# Patient Record
Sex: MALE | Race: WHITE | URBAN - METROPOLITAN AREA
[De-identification: names, ages, dates, MRNs, and addresses within clinical notes are randomized per-mention and may not be internally consistent; named-entity substitution may affect disease eponyms.]

---

## 2017-07-11 ENCOUNTER — EMERGENCY (EMERGENCY)
Facility: HOSPITAL | Age: 62
LOS: 1 days | Discharge: HOME | End: 2017-07-11
Attending: EMERGENCY MEDICINE | Admitting: EMERGENCY MEDICINE
Payer: COMMERCIAL

## 2017-07-11 VITALS
OXYGEN SATURATION: 98 % | HEART RATE: 70 BPM | RESPIRATION RATE: 20 BRPM | DIASTOLIC BLOOD PRESSURE: 93 MMHG | SYSTOLIC BLOOD PRESSURE: 190 MMHG | TEMPERATURE: 99 F

## 2017-07-11 VITALS
DIASTOLIC BLOOD PRESSURE: 91 MMHG | RESPIRATION RATE: 20 BRPM | SYSTOLIC BLOOD PRESSURE: 179 MMHG | OXYGEN SATURATION: 99 % | HEART RATE: 72 BPM

## 2017-07-11 LAB
BASOPHILS # BLD AUTO: 0.1 K/UL — SIGNIFICANT CHANGE UP (ref 0–0.2)
BASOPHILS NFR BLD AUTO: 1 % — SIGNIFICANT CHANGE UP (ref 0–2)
EOSINOPHIL # BLD AUTO: 0.1 K/UL — SIGNIFICANT CHANGE UP (ref 0–0.5)
EOSINOPHIL NFR BLD AUTO: 1.2 % — SIGNIFICANT CHANGE UP (ref 0–6)
HCT VFR BLD CALC: 42.8 % — SIGNIFICANT CHANGE UP (ref 39–50)
HGB BLD-MCNC: 15 G/DL — SIGNIFICANT CHANGE UP (ref 13–17)
LYMPHOCYTES # BLD AUTO: 4.3 K/UL — HIGH (ref 1–3.3)
LYMPHOCYTES # BLD AUTO: 47.5 % — HIGH (ref 13–44)
MCHC RBC-ENTMCNC: 33.4 PG — SIGNIFICANT CHANGE UP (ref 27–34)
MCHC RBC-ENTMCNC: 35.1 GM/DL — SIGNIFICANT CHANGE UP (ref 32–36)
MCV RBC AUTO: 95.1 FL — SIGNIFICANT CHANGE UP (ref 80–100)
MONOCYTES # BLD AUTO: 0.8 K/UL — SIGNIFICANT CHANGE UP (ref 0–0.9)
MONOCYTES NFR BLD AUTO: 8.3 % — SIGNIFICANT CHANGE UP (ref 2–14)
NEUTROPHILS # BLD AUTO: 3.8 K/UL — SIGNIFICANT CHANGE UP (ref 1.8–7.4)
NEUTROPHILS NFR BLD AUTO: 42 % — LOW (ref 43–77)
PLATELET # BLD AUTO: 218 K/UL — SIGNIFICANT CHANGE UP (ref 150–400)
RBC # BLD: 4.5 M/UL — SIGNIFICANT CHANGE UP (ref 4.2–5.8)
RBC # FLD: 12 % — SIGNIFICANT CHANGE UP (ref 10.3–14.5)
WBC # BLD: 9.1 K/UL — SIGNIFICANT CHANGE UP (ref 3.8–10.5)
WBC # FLD AUTO: 9.1 K/UL — SIGNIFICANT CHANGE UP (ref 3.8–10.5)

## 2017-07-11 PROCEDURE — 83690 ASSAY OF LIPASE: CPT

## 2017-07-11 PROCEDURE — 85027 COMPLETE CBC AUTOMATED: CPT

## 2017-07-11 PROCEDURE — 99284 EMERGENCY DEPT VISIT MOD MDM: CPT

## 2017-07-11 PROCEDURE — 81001 URINALYSIS AUTO W/SCOPE: CPT

## 2017-07-11 PROCEDURE — 80053 COMPREHEN METABOLIC PANEL: CPT

## 2017-07-11 PROCEDURE — 99283 EMERGENCY DEPT VISIT LOW MDM: CPT

## 2017-07-11 RX ORDER — METFORMIN HYDROCHLORIDE 850 MG/1
0 TABLET ORAL
Qty: 0 | Refills: 0 | COMMUNITY

## 2017-07-11 RX ORDER — ACETAMINOPHEN 500 MG
650 TABLET ORAL ONCE
Qty: 0 | Refills: 0 | Status: COMPLETED | OUTPATIENT
Start: 2017-07-11 | End: 2017-07-11

## 2017-07-11 RX ORDER — ICOSAPENT ETHYL 500 MG/1
0 CAPSULE, LIQUID FILLED ORAL
Qty: 0 | Refills: 0 | COMMUNITY

## 2017-07-11 RX ADMIN — Medication 650 MILLIGRAM(S): at 23:54

## 2017-07-11 NOTE — ED PROVIDER NOTE - OBJECTIVE STATEMENT
Pt is a 62 year old M w/ PMH of DM2, hypothyroidism, HTN, HLD, and lumbar herniations that presents to the ED with right flank pain. He states that the pain started today at 3 PM after he had. He describes it as a achy sensation that is worse with certain types of movements. He tried Advil at home with some relief. The pain radiates to the back. He denies hematuria, dysuria, saddle anesthesia, fevers, SOB, CP, travel hx, N/V, or diarrhea. He does admit to having constipation over the past two days. Pt is a 62 year old M w/ PMH of DM2, hypothyroidism, HTN, HLD, and lumbar herniations that presents to the ED with right flank pain. He states that the pain started today at 3 PM after he had. He describes it as a sharp or burning sensation that is worse with certain types of movements. He tried Advil at home with some relief. The pain radiates to the back. He denies hematuria, dysuria, saddle anesthesia, fevers, SOB, CP, travel hx, N/V, or diarrhea. He does admit to having constipation over the past two days.

## 2017-07-11 NOTE — ED ADULT NURSE NOTE - OBJECTIVE STATEMENT
63 y/o male hx of DM, enlarge heart and hypothyroidism bib daughter c/o right flank pain since 3PM. Pt denies nausea/vomiting, no pain during urination, report constipation. Denies chills/ fever, no CP or SOB. BS at all quadrants, non tender to touch, non distended. Safety maintained & continue monitor.

## 2017-07-11 NOTE — ED PROVIDER NOTE - GENITOURINARY NEGATIVE STATEMENT, MLM
no dysuria, no frequency, and no hematuria. no dysuria, no frequency, and no hematuria, no incontinence or difficulty urinating

## 2017-07-11 NOTE — ED PROVIDER NOTE - CARE PLAN
Principal Discharge DX:	Lumbar back pain  Goal:	- resolution of pain  Instructions for follow-up, activity and diet:	- Please follow up with your primary care physician.   - Please take Tylenol as needed for pain

## 2017-07-11 NOTE — ED PROVIDER NOTE - ATTENDING CONTRIBUTION TO CARE
ATTENDING MD:  Norris LEWIS, personally have seen and examined this patient.  I have discussed all aspects of care with the resident physician. Resident note reviewed and agree on plan of care and except where noted.  See HPI, PE, and MDM for details.     VITALS: reviewed  GEN: NAD, A & O x 4  HEAD/EYES: NCAT, PERRL, EOMI, anicteric sclerae, no conjunctival pallor  ENT: mucus membranes moist, oropharynx WNL, trachea midline, no JVD  CHEST: lungs CTA with equal breath sounds bilaterally, chest wall nontender and atraumatic  CV: heart with reg rhythm S1, S2, no murmur; distal pulses intact and symmetric bilaterally  ABDOMEN: normoactive bowel sounds, soft, nondistended, nontender, no masses  : no CVAT, no suprapubic tenderness or fullness  MSK: extremities atraumatic and nontender, no edema. the back is without midline tenderness, deformity or stepoff and is ranged with significant rotation reproducing pain. there is a mid-lumbar point paraspinally that is poitn tender and refers pain across the abdomen reproducing pain of complaint. the neck has no midline tenderness, deformity, or stepoff, and is ranged painlessly. negative straight leg raise bilaterally.  SKIN: no rash, no bruising, no cyanosis. color appropriate for ethnicity  NEURO: alert, mentating appropriately, no facial asymmetry. gross sensation, motor, coordination are intact  PSYCH: Affect appropriate     MDM: likely radicular pain, improve with tylenol. advised pt on nature of disease and course of illness, warning signs. pt will take tylenola nd motrin and f/u with PCP.

## 2017-07-11 NOTE — ED PROVIDER NOTE - PLAN OF CARE
- resolution of pain - Please follow up with your primary care physician.   - Please take Tylenol as needed for pain

## 2017-07-11 NOTE — ED PROVIDER NOTE - MUSCULOSKELETAL NEGATIVE STATEMENT, MLM
no back pain, no gout, no musculoskeletal pain, no neck pain, and no weakness. no neck pain, and no weakness. known disc disease

## 2017-07-11 NOTE — ED PROVIDER NOTE - MEDICAL DECISION MAKING DETAILS
Pt is a 62 year old M with lumbar disk herniation that presents with right flank pain. The patients pain was thought to be related to back pain given his history. Lab work was taken to r/o pancreatitis and biliary pathology. UA was taken to r/o blood in urine. Pt was discharged once all was negative and pain resolved with Tylenol.

## 2017-07-12 LAB
ALBUMIN SERPL ELPH-MCNC: 4.3 G/DL — SIGNIFICANT CHANGE UP (ref 3.3–5)
ALP SERPL-CCNC: 59 U/L — SIGNIFICANT CHANGE UP (ref 40–120)
ALT FLD-CCNC: 18 U/L RC — SIGNIFICANT CHANGE UP (ref 10–45)
ANION GAP SERPL CALC-SCNC: 15 MMOL/L — SIGNIFICANT CHANGE UP (ref 5–17)
APPEARANCE UR: ABNORMAL
AST SERPL-CCNC: 27 U/L — SIGNIFICANT CHANGE UP (ref 10–40)
BILIRUB SERPL-MCNC: 0.3 MG/DL — SIGNIFICANT CHANGE UP (ref 0.2–1.2)
BILIRUB UR-MCNC: NEGATIVE — SIGNIFICANT CHANGE UP
BUN SERPL-MCNC: 13 MG/DL — SIGNIFICANT CHANGE UP (ref 7–23)
CALCIUM SERPL-MCNC: 8.9 MG/DL — SIGNIFICANT CHANGE UP (ref 8.4–10.5)
CHLORIDE SERPL-SCNC: 104 MMOL/L — SIGNIFICANT CHANGE UP (ref 96–108)
CO2 SERPL-SCNC: 22 MMOL/L — SIGNIFICANT CHANGE UP (ref 22–31)
COLOR SPEC: YELLOW — SIGNIFICANT CHANGE UP
COMMENT - URINE: SIGNIFICANT CHANGE UP
CREAT SERPL-MCNC: 0.77 MG/DL — SIGNIFICANT CHANGE UP (ref 0.5–1.3)
DIFF PNL FLD: NEGATIVE — SIGNIFICANT CHANGE UP
GLUCOSE SERPL-MCNC: 97 MG/DL — SIGNIFICANT CHANGE UP (ref 70–99)
GLUCOSE UR QL: 500
KETONES UR-MCNC: NEGATIVE — SIGNIFICANT CHANGE UP
LEUKOCYTE ESTERASE UR-ACNC: NEGATIVE — SIGNIFICANT CHANGE UP
LIDOCAIN IGE QN: 34 U/L — SIGNIFICANT CHANGE UP (ref 7–60)
NITRITE UR-MCNC: NEGATIVE — SIGNIFICANT CHANGE UP
PH UR: 6 — SIGNIFICANT CHANGE UP (ref 5–8)
POTASSIUM SERPL-MCNC: 4.6 MMOL/L — SIGNIFICANT CHANGE UP (ref 3.5–5.3)
POTASSIUM SERPL-SCNC: 4.6 MMOL/L — SIGNIFICANT CHANGE UP (ref 3.5–5.3)
PROT SERPL-MCNC: 7.5 G/DL — SIGNIFICANT CHANGE UP (ref 6–8.3)
PROT UR-MCNC: 30 MG/DL
RBC CASTS # UR COMP ASSIST: SIGNIFICANT CHANGE UP /HPF (ref 0–2)
SODIUM SERPL-SCNC: 141 MMOL/L — SIGNIFICANT CHANGE UP (ref 135–145)
SP GR SPEC: >1.03 — HIGH (ref 1.01–1.02)
UROBILINOGEN FLD QL: NEGATIVE — SIGNIFICANT CHANGE UP
WBC UR QL: SIGNIFICANT CHANGE UP /HPF (ref 0–5)

## 2018-07-08 NOTE — ED PROVIDER NOTE - PSYCHIATRIC, MLM
MED Alert and oriented to person, place, time/situation. normal mood and affect. no apparent risk to self or others.

## 2021-08-10 ENCOUNTER — APPOINTMENT (OUTPATIENT)
Dept: ORTHOPEDIC SURGERY | Facility: CLINIC | Age: 66
End: 2021-08-10

## 2021-08-18 ENCOUNTER — APPOINTMENT (OUTPATIENT)
Dept: ORTHOPEDIC SURGERY | Facility: CLINIC | Age: 66
End: 2021-08-18
Payer: MEDICARE

## 2021-08-18 ENCOUNTER — NON-APPOINTMENT (OUTPATIENT)
Age: 66
End: 2021-08-18

## 2021-08-18 VITALS — BODY MASS INDEX: 28.93 KG/M2 | HEIGHT: 66 IN | WEIGHT: 180 LBS

## 2021-08-18 DIAGNOSIS — M25.561 PAIN IN RIGHT KNEE: ICD-10-CM

## 2021-08-18 PROCEDURE — 73564 X-RAY EXAM KNEE 4 OR MORE: CPT | Mod: RT

## 2021-08-18 PROCEDURE — 99204 OFFICE O/P NEW MOD 45 MIN: CPT

## 2021-08-18 NOTE — PHYSICAL EXAM
[de-identified] : Constitutional:Well nourished , well developed and in no acute distress\par Psychiatric: Alert and oriented to time place and person.Appropriate affect \par Skin:Head, neck, arms and lower extremities:no lesions or discoloration\par HEENT: Normocephalic, EOM intact, Nasal septum midline,\par Respiratory: Unlabored respirations,no audible wheezing ,no tachypnea, no cyanosis\par Cardiovascular: no leg swelling  no ankle edema no JVD, pulse regular\par Vascular: no calf or thigh tenderness, \par Peripheral pulses; intact\par Lymphatics:No groin adenopathy,no lymphedema lower  or upper extremities\par Right knee normal alignment effusion 1-2+ marked focal tenderness medial femoral condyle passive range of motion 5/90 provocation of pain ligaments grossly intact neurovascular intact patella quadriceps tendon intact [de-identified] : X-rays right knee multiple views negative for fracture

## 2021-08-18 NOTE — HISTORY OF PRESENT ILLNESS
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  On July 21, 2021 patient was stepping down from a sidewalk when he fell and struck the anterior aspect of right knee.  The end underwent emergency room evaluation.  X-rays were according to daughter negative for fracture.  Patient dancing chronic pain anteromedial right knee aggravated by walking and associated with decreased range of motion.  He is ambulating with a cane because of sense of instability which he began to experience preinjury

## 2021-08-18 NOTE — DISCUSSION/SUMMARY
[de-identified] : Impression posttraumatic pain right knee with moderate effusion rule out bone contusion rule out occult fracture with or without ligamentous injury\par Plan MRI right knee, knee immobilizer

## 2021-08-19 NOTE — ED ADULT NURSE NOTE - TEMPLATE
Normal volume, rate, productivity, spontaneity and articulation Normal volume, rate, productivity, spontaneity and articulation General Normal volume, rate, productivity, spontaneity and articulation Normal volume, rate, productivity, spontaneity and articulation

## 2021-08-23 ENCOUNTER — NON-APPOINTMENT (OUTPATIENT)
Age: 66
End: 2021-08-23

## 2021-08-27 ENCOUNTER — APPOINTMENT (OUTPATIENT)
Dept: ORTHOPEDIC SURGERY | Facility: CLINIC | Age: 66
End: 2021-08-27
Payer: MEDICARE

## 2021-08-27 DIAGNOSIS — S80.01XA CONTUSION OF RIGHT KNEE, INITIAL ENCOUNTER: ICD-10-CM

## 2021-08-27 DIAGNOSIS — S80.11XA CONTUSION OF RIGHT KNEE, INITIAL ENCOUNTER: ICD-10-CM

## 2021-08-27 PROCEDURE — 99213 OFFICE O/P EST LOW 20 MIN: CPT

## 2021-08-27 NOTE — PHYSICAL EXAM
[de-identified] : Constitutional:Well nourished , well developed and in no acute distress\par Psychiatric: Alert and oriented to time place and person.Appropriate affect \par Skin:Head, neck, arms and lower extremities:no lesions or discoloration\par HEENT: Normocephalic, EOM intact, Nasal septum midline,\par Respiratory: Unlabored respirations,no audible wheezing ,no tachypnea, no cyanosis\par Cardiovascular: no leg swelling  no ankle edema no JVD, pulse regular\par Vascular: no calf or thigh tenderness, \par Peripheral pulses; intact\par Lymphatics:No groin adenopathy,no lymphedema lower  or upper extremities\par Right knee normal alignment effusion 1-2+ marked focal tenderness medial femoral condyle passive range of motion 5/90 provocation of pain ligaments grossly intact neurovascular intact patella quadriceps tendon intact [de-identified] : MRI right knee of October 23, 2021 reveals subacute sprain medial femoral condyle proximally patella chondromalacia moderate effusion nondisplaced subacute subchondral microfracture microtrabecular fracture medial margin central weightbearing medial femoral condyle exuberant bone marrow edema relative preservation overlying articular cartilage

## 2021-08-27 NOTE — HISTORY OF PRESENT ILLNESS
[de-identified] : Patient seen in follow-up right knee.  Continues to experience pain medial aspect right knee aggravated by weightbearing

## 2021-08-27 NOTE — DISCUSSION/SUMMARY
[de-identified] : Impression microtrabecular fracture left knee medial femoral condyle\par Plan hinged knee brace, crutches, toe-touch weightbearing, clinical radiographic evaluation 2 weeks

## 2022-01-22 NOTE — ED ADULT NURSE NOTE - ISOLATION TYPE:
1/22/2022    Time Called: 10:15  Time Arrived: 13:00    The patient was seen at the request of Dr Vega    HPI: Osvaldo Pate is a 69 y.o. male who presents with complaints of left leg wound.  This started a few days ago after unknown trauma.  The pain is 0/10 and is described as sharp.  The wound has not improved with local wound care    Past Medical History:   Diagnosis Date   • A-fib (HCC)    • Atrial flutter (HCC)    • Blood clotting disorder (HCC)     Patient is on Xarelto   • Bowel habit changes     Colostomy   • GERD (gastroesophageal reflux disease)    • Hypertension    • Neurogenic bladder    • Quadriplegia, C5-C7 complete (HCC)        Past Surgical History:   Procedure Laterality Date   • NE CYSTOSCOPY,INSERT URETERAL STENT Left 1/4/2022    Procedure: CYSTOSCOPY, WITH URETERAL STENT INSERTION;  Surgeon: Osvaldo Baltazar M.D.;  Location: [facility];  Service: Urology   • NE CYSTO/URETERO/PYELOSCOPY, DX Left 1/4/2022    Procedure: URETEROSCOPY;  Surgeon: Osvaldo Baltazar M.D.;  Location: [facility];  Service: Urology   • LASERTRIPSY N/A 1/4/2022    Procedure: LITHOTRIPSY, USING LASER;  Surgeon: Osvaldo Baltazar M.D.;  Location: [facility];  Service: Urology   • NE CYSTOSCOPY,INSERT URETERAL STENT Left 12/16/2021    Procedure: CYSTOSCOPY, WITH URETERAL STENT INSERTION;  Surgeon: Aly Bowen M.D.;  Location: [facility];  Service: Urology   • NE CYSTO/URETERO/PYELOSCOPY, DX Left 12/16/2021    Procedure: URETEROSCOPY;  Surgeon: Aly Bowen M.D.;  Location: [facility];  Service: Urology   • LASERTRIPSY Left 12/16/2021    Procedure: LITHOTRIPSY, USING LASER;  Surgeon: Aly Bowen M.D.;  Location: [facility];  Service: Urology   • IRRIGATION & DEBRIDEMENT GENERAL  12/20/2020    Procedure: IRRIGATION AND DEBRIDEMENT, WOUND SACRAL ULCER;  Surgeon: Matt Cummins M.D.;  Location: [facility];  Service: Plastics    • ULCER DEBRIDEMENT N/A 8/21/2019    Procedure: debridement of Sacral grade 4 ulcer - W/BONE BIOPSY, 3 liter wash out. bilateral sliding gluteal myocutaneous flap advancement;  Surgeon: Amadeo Moon M.D.;  Location: [facility];  Service: Plastics   • FLAP CLOSURE  8/21/2019    Procedure: CLOSURE, FLAP - MUSCLE;  Surgeon: Amadeo Moon M.D.;  Location: [facility];  Service: Plastics   • COLOSTOMY N/A 7/27/2019    Procedure: CREATION, COLOSTOMY -  placement;  Surgeon: Elías Hannah M.D.;  Location: SUR[facility]ERY [facility];  Service: General   • COLOSTOMY     • COLOSTOMY TAKEDOWN     • HERNIA REPAIR     • PERCUTANEOUOSPINNING LOWER EXTREMITY         Medications  No current facility-administered medications on file prior to encounter.     Current Outpatient Medications on File Prior to Encounter   Medication Sig Dispense Refill   • nitrofurantoin (MACROBID) 100 MG Cap Take 100 mg by mouth every day.     • ciprofloxacin (CIPRO) 500 MG Tab Take 500 mg by mouth 2 times a day. 17 day course     • oxyCODONE immediate-release (ROXICODONE) 5 MG Tab Take one tab po BID PRN pain (Patient taking differently: Take 5 mg by mouth 2 times a day as needed. Take one tab po BID PRN pain) 30 Tablet 0   • Ascorbic Acid (VITAMIN C) 1000 MG Tab Take 1,000 mg by mouth every day.     • Zinc Sulfate 50 MG Cap Take 50 mg by mouth every day.     • amLODIPine (NORVASC) 10 MG Tab Take 10 mg by mouth every day. Hold <100/64     • melatonin 5 mg Tab Take 10 mg by mouth at bedtime.     • magnesium oxide (MAG-OX) 400 MG Tab tablet Take 400 mg by mouth every day.     • polyethylene glycol/lytes (MIRALAX) 17 g Pack Take 17 g by mouth every day.     • Probiotic Product (PROBIOTIC PO) Take 1 Tab by mouth every day.     • acetaminophen (TYLENOL) 500 MG Tab Take 1,000 mg by mouth 2 times a day. Takes 1000 mg twice daily then 500 mg every 4 hours as needed (3grams per 24 hours)      • Cholecalciferol (VITAMIN D)  2000 UNIT Tab Take 2,000 Units by mouth every day.     • sennosides (SENOKOT) 8.6 MG Tab Take 17.2 mg by mouth 2 times a day.     • ferrous sulfate 325 (65 Fe) MG tablet Take 325 mg by mouth 2 Times a Day.     • docusate sodium (COLACE) 100 MG Cap Take 200 mg by mouth 2 times a day.     • baclofen (LIORESAL) 20 MG tablet Take 20 mg by mouth 4 times a day.     • losartan (COZAAR) 50 MG Tab Take 50 mg by mouth every day. Hold <100/64         Allergies  Sulfa drugs    ROS  Left leg wound. All other systems were reviewed and found to be negative    Family History   Problem Relation Age of Onset   • Heart Disease Father        Social History     Socioeconomic History   • Marital status:      Spouse name: Not on file   • Number of children: Not on file   • Years of education: Not on file   • Highest education level: Not on file   Occupational History   • Not on file   Tobacco Use   • Smoking status: Former Smoker     Packs/day: 1.00     Types: Cigarettes     Start date: 1967     Quit date: 1977     Years since quittin.0   • Smokeless tobacco: Never Used   Vaping Use   • Vaping Use: Never used   Substance and Sexual Activity   • Alcohol use: Yes     Alcohol/week: 0.6 oz     Types: 1 Standard drinks or equivalent per week     Comment: nightly   • Drug use: No   • Sexual activity: Not on file   Other Topics Concern   • Not on file   Social History Narrative   • Not on file     Social Determinants of Health     Financial Resource Strain:    • Difficulty of Paying Living Expenses: Not on file   Food Insecurity:    • Worried About Running Out of Food in the Last Year: Not on file   • Ran Out of Food in the Last Year: Not on file   Transportation Needs:    • Lack of Transportation (Medical): Not on file   • Lack of Transportation (Non-Medical): Not on file   Physical Activity:    • Days of Exercise per Week: Not on file   • Minutes of Exercise per Session: Not on file   Stress:    • Feeling of Stress : Not on  file   Social Connections:    • Frequency of Communication with Friends and Family: Not on file   • Frequency of Social Gatherings with Friends and Family: Not on file   • Attends Protestant Services: Not on file   • Active Member of Clubs or Organizations: Not on file   • Attends Club or Organization Meetings: Not on file   • Marital Status: Not on file   Intimate Partner Violence:    • Fear of Current or Ex-Partner: Not on file   • Emotionally Abused: Not on file   • Physically Abused: Not on file   • Sexually Abused: Not on file   Housing Stability:    • Unable to Pay for Housing in the Last Year: Not on file   • Number of Places Lived in the Last Year: Not on file   • Unstable Housing in the Last Year: Not on file       Physical Exam  Vitals  /73   Pulse (!) 53   Temp 36.3 °C (97.4 °F) (Temporal)   Resp 18   Wt 105 kg (231 lb 11.3 oz)   SpO2 97%   General: Well Developed, Well Nourished, Age appropriate appearance  HEENT: Normocephalic, atraumatic  Psych: Normal mood and affect  Neck: Supple, nontender, no masses  Lungs: Breathing unlabored, No audible wheezing  Heart: Regular heart rate and rhythm  Abdomen: Soft, NT, ND  Neuro: Sensation grossly intact to BUE and BLE, moving all four extremities  Skin: Intact, no open wounds  Vascular: 1+DP/PT, Capillary refill <2 seconds  MSK: 3 cm medial ankle wound      Radiographs:  MR-ANKLE W/O LEFT    (Results Pending)       Laboratory Values  Recent Labs     01/21/22  1518 01/22/22  0210   WBC 7.4 6.8   RBC 3.85* 3.71*   HEMOGLOBIN 13.0* 12.6*   HEMATOCRIT 39.2* 38.8*   .8* 104.6*   MCH 33.8* 34.0*   MCHC 33.2* 32.5*   RDW 51.8* 53.6*   PLATELETCT 169 172   MPV 9.4 9.0     Recent Labs     01/21/22  1518 01/22/22  0210   SODIUM 140 143   POTASSIUM 4.7 5.0   CHLORIDE 104 108   CO2 25 25   GLUCOSE 85 91   BUN 12 14             Impression: Left leg wound    Plan:We discussed the diagnosis and findings with the patient at length.  We reviewed possible non  operative and operative interventions and the risks and benefits of each of these.  he had a chance to ask questions and all of these were answered to his satisfaction. The patient chose to proceed with  operative intervention. Risks and benefits of surgery were discussed which include but are not limited to bleeding, infection, neurovascular damage, malunion, nonunion, instability, limb length discrepancy, DVT, PE, MI, Stroke and death. They understand these risks and wish to proceed.         None

## 2022-04-13 NOTE — ED PROVIDER NOTE - ENDOCRINE NEGATIVE STATEMENT, MLM
Bathing allowed/Do not drive or operate machinery no diabetes and no thyroid trouble. +diabetes  and no thyroid trouble.

## 2022-07-08 NOTE — ED PROVIDER NOTE - TEMPLATE
Please follow up with Dr. Sena 1-2 weeks after Discharge. Your brilinta medication has been stopped by Dr. Sena. Please continue to take your aspirin. Abdominal Pain, N/V/D

## 2022-08-10 NOTE — ED PROVIDER NOTE - NS ED MD DISPO DISCHARGE CCDA
To CW, please advise on clearance request, thank you.    Last OV: 3/1/2022  Blood thinner: none noted  No Hx stent or heart valve replacement    ==================    Received fax from [facility] (P:  [phone] F: [phone]) requesting:    - cardiac clearance for upcoming cystoscopy with bladder biopsy and fulguration (surg) scheduled 8/23/2022.  - Blood thinner hold 7 days prior to procedure.    Fax sent to scanning for reference via [employer] completed status received.     Patient/Caregiver provided printed discharge information.

## 2023-02-07 ENCOUNTER — NON-APPOINTMENT (OUTPATIENT)
Age: 68
End: 2023-02-07
